# Patient Record
Sex: FEMALE | Race: WHITE | NOT HISPANIC OR LATINO | URBAN - METROPOLITAN AREA
[De-identification: names, ages, dates, MRNs, and addresses within clinical notes are randomized per-mention and may not be internally consistent; named-entity substitution may affect disease eponyms.]

---

## 2020-06-24 ENCOUNTER — TELEPHONE ENCOUNTER (OUTPATIENT)
Dept: URBAN - METROPOLITAN AREA CLINIC 92 | Facility: CLINIC | Age: 45
End: 2020-06-24

## 2020-07-15 ENCOUNTER — OUT OF OFFICE VISIT (OUTPATIENT)
Dept: URBAN - METROPOLITAN AREA MEDICAL CENTER 28 | Facility: MEDICAL CENTER | Age: 45
End: 2020-07-15
Payer: COMMERCIAL

## 2020-07-15 DIAGNOSIS — K50.90 ACUTE CROHN'S DISEASE: ICD-10-CM

## 2020-07-15 DIAGNOSIS — R93.3 ABN FINDINGS-GI TRACT: ICD-10-CM

## 2020-07-15 PROCEDURE — 99254 IP/OBS CNSLTJ NEW/EST MOD 60: CPT | Performed by: INTERNAL MEDICINE

## 2020-07-16 ENCOUNTER — OUT OF OFFICE VISIT (OUTPATIENT)
Dept: URBAN - METROPOLITAN AREA MEDICAL CENTER 28 | Facility: MEDICAL CENTER | Age: 45
End: 2020-07-16
Payer: COMMERCIAL

## 2020-07-16 ENCOUNTER — OUT OF OFFICE VISIT (OUTPATIENT)
Dept: URBAN - METROPOLITAN AREA MEDICAL CENTER 28 | Facility: MEDICAL CENTER | Age: 45
End: 2020-07-16

## 2020-07-16 DIAGNOSIS — K50.80 CROHN'S COLITIS: ICD-10-CM

## 2020-07-16 DIAGNOSIS — R10.84 ABDOMINAL CRAMPING, GENERALIZED: ICD-10-CM

## 2020-07-16 DIAGNOSIS — Z87.19 H/O DIVERTICULITIS OF COLON: ICD-10-CM

## 2020-07-16 PROCEDURE — 992 APS NON BILLABLE: Performed by: INTERNAL MEDICINE

## 2020-07-16 PROCEDURE — G9937 DIG OR SURV COLSCO: HCPCS | Performed by: INTERNAL MEDICINE

## 2020-07-16 PROCEDURE — 45380 COLONOSCOPY AND BIOPSY: CPT | Performed by: INTERNAL MEDICINE

## 2020-07-16 PROCEDURE — 99232 SBSQ HOSP IP/OBS MODERATE 35: CPT | Performed by: INTERNAL MEDICINE

## 2020-07-29 ENCOUNTER — WEB ENCOUNTER (OUTPATIENT)
Dept: URBAN - METROPOLITAN AREA CLINIC 96 | Facility: CLINIC | Age: 45
End: 2020-07-29

## 2020-07-29 ENCOUNTER — OFFICE VISIT (OUTPATIENT)
Dept: URBAN - METROPOLITAN AREA CLINIC 96 | Facility: CLINIC | Age: 45
End: 2020-07-29
Payer: COMMERCIAL

## 2020-07-29 DIAGNOSIS — R10.31 RLQ ABDOMINAL PAIN: ICD-10-CM

## 2020-07-29 DIAGNOSIS — K50.912 CROHN'S DISEASE WITH INTESTINAL OBSTRUCTION, UNSPECIFIED GASTROINTESTINAL TRACT LOCATION: ICD-10-CM

## 2020-07-29 DIAGNOSIS — Z85.3 PERSONAL HISTORY OF BREAST CANCER: ICD-10-CM

## 2020-07-29 PROCEDURE — G8427 DOCREV CUR MEDS BY ELIG CLIN: HCPCS | Performed by: INTERNAL MEDICINE

## 2020-07-29 PROCEDURE — 99214 OFFICE O/P EST MOD 30 MIN: CPT | Performed by: INTERNAL MEDICINE

## 2020-07-29 PROCEDURE — G8417 CALC BMI ABV UP PARAM F/U: HCPCS | Performed by: INTERNAL MEDICINE

## 2020-07-29 PROCEDURE — 1036F TOBACCO NON-USER: CPT | Performed by: INTERNAL MEDICINE

## 2020-07-29 RX ORDER — TAMOXIFEN CITRATE 20 MG/1
TABLET, FILM COATED ORAL
Qty: 0 | Refills: 0 | Status: ACTIVE | COMMUNITY
Start: 1900-01-01

## 2020-07-29 RX ORDER — ADALIMUMAB 40MG/0.4ML
AS DIRECTED KIT SUBCUTANEOUS EVERY 2 WEEKS
Qty: 6 SYRINGE | Refills: 4 | OUTPATIENT
Start: 2020-07-29 | End: 2021-10-22

## 2020-07-29 RX ORDER — ERGOCALCIFEROL 1.25 MG/1
TAKE 1 CAPSULE (50,000 UNIT) BY ORAL ROUTE ONCE WEEKLY CAPSULE ORAL
Qty: 10 | Refills: 0 | Status: ACTIVE | COMMUNITY
Start: 2019-06-25

## 2020-07-29 RX ORDER — LISINOPRIL 10 MG/1
TABLET ORAL
Qty: 0 | Refills: 0 | Status: ACTIVE | COMMUNITY
Start: 1900-01-01

## 2020-07-29 NOTE — HPI-TODAY'S VISIT:
45-year-old female with history of Crohn's disease and breast cancer who was recently at [facility] emergency department complaining of abdominal pain on 7/15/2020.  Patient has been maintained on Humira for her Crohn's disease and had previously was doing well.  However, patient had been having intermittent obstructive symptoms with prior colonoscopy in May 2019 demonstrating ileocecal valve that was minimally narrowed but able to be traversed. CT imaging 7/7/2020 with ileitis--long segment wall thickneing of the distal ileum with mild upstream dilatation of the small bowel, patient did not want admission and was discharged. Again presented to ER 7/15/2020 with abdominal pain, n/v. Distal and termial ileitis again noted witj mild distention of .  Patient was asmall bowel loops proximal to the segment of inflamed distal ileum. Patient dmitted and received IV steroids.  Inpatient colonoscopy performed with Dr. Arce 7/17/2020 with stricture at the ICV unable to traverse, not amenable to balloon dilation. Bx of the ICV with no significant pathologic changes. Patient was discharged and instructed to follow-up. Patient reports

## 2020-07-29 NOTE — PHYSICAL EXAM GASTROINTESTINAL
Abdomen , soft,  tender in RLQ with no guarding or rigidity , no masses palpable , normal bowel sounds , Liver and Spleen , no hepatomegaly present , no hepatosplenomegaly , liver nontender , spleen not palpable

## 2020-08-04 ENCOUNTER — TELEPHONE ENCOUNTER (OUTPATIENT)
Dept: URBAN - METROPOLITAN AREA CLINIC 96 | Facility: CLINIC | Age: 45
End: 2020-08-04

## 2020-09-28 ENCOUNTER — OFFICE VISIT (OUTPATIENT)
Dept: URBAN - METROPOLITAN AREA CLINIC 96 | Facility: CLINIC | Age: 45
End: 2020-09-28
Payer: COMMERCIAL

## 2020-09-28 DIAGNOSIS — R10.31 RLQ ABDOMINAL PAIN: ICD-10-CM

## 2020-09-28 DIAGNOSIS — K50.912 CROHN'S DISEASE WITH INTESTINAL OBSTRUCTION, UNSPECIFIED GASTROINTESTINAL TRACT LOCATION: ICD-10-CM

## 2020-09-28 DIAGNOSIS — Z85.3 PERSONAL HISTORY OF BREAST CANCER: ICD-10-CM

## 2020-09-28 PROCEDURE — 99214 OFFICE O/P EST MOD 30 MIN: CPT | Performed by: INTERNAL MEDICINE

## 2020-09-28 RX ORDER — LISINOPRIL 10 MG/1
TABLET ORAL
Qty: 0 | Refills: 0 | Status: ACTIVE | COMMUNITY
Start: 1900-01-01

## 2020-09-28 RX ORDER — TAMOXIFEN CITRATE 20 MG/1
TABLET, FILM COATED ORAL
Qty: 0 | Refills: 0 | Status: DISCONTINUED | COMMUNITY
Start: 1900-01-01

## 2020-09-28 RX ORDER — ERGOCALCIFEROL 1.25 MG/1
TAKE 1 CAPSULE (50,000 UNIT) BY ORAL ROUTE ONCE WEEKLY CAPSULE ORAL
Qty: 10 | Refills: 0 | Status: ACTIVE | COMMUNITY
Start: 2019-06-25

## 2020-09-28 RX ORDER — ADALIMUMAB 40MG/0.4ML
AS DIRECTED KIT SUBCUTANEOUS EVERY 2 WEEKS
Qty: 6 SYRINGE | Refills: 4 | Status: ACTIVE | COMMUNITY
Start: 2020-07-29 | End: 2021-10-22

## 2020-09-28 RX ORDER — ADALIMUMAB 40MG/0.4ML
AS DIRECTED KIT SUBCUTANEOUS EVERY 2 WEEKS
Qty: 6 SYRINGE | Refills: 4 | OUTPATIENT

## 2020-09-28 NOTE — HPI-TODAY'S VISIT:
45-year-old female with history of Crohn's disease and breast cancer who was recently seen 7/29/2020. As noted prior, had beenm seen at [facility] emergency department complaining of abdominal pain on 7/15/2020.  Patient has been maintained on Humira for her Crohn's disease and had previously was doing well.  However, patient had been having intermittent obstructive symptoms with prior colonoscopy in May 2019 demonstrating ileocecal valve that was minimally narrowed but able to be traversed. CT imaging 7/7/2020 with ileitis--long segment wall thickneing of the distal ileum with mild upstream dilatation of the small bowel, patient did not want admission and was discharged. Again presented to ER 7/15/2020 with abdominal pain, n/v. Distal and termial ileitis again noted witj mild distention of .  Patient was asmall bowel loops proximal to the segment of inflamed distal ileum. Patient dmitted and received IV steroids.  Inpatient colonoscopy performed with Dr. Arce 7/17/2020 with stricture at the ICV unable to traverse, not amenable to balloon dilation. Bx of the ICV with no significant pathologic changes. Patient was discharged and instructed to follow-up. Patient was advised to follow up with CRS Dr. Perez given stricture not amenable to endoscopic dilation. She reports

## 2020-11-03 ENCOUNTER — WEB ENCOUNTER (OUTPATIENT)
Dept: URBAN - METROPOLITAN AREA CLINIC 96 | Facility: CLINIC | Age: 45
End: 2020-11-03

## 2020-11-03 ENCOUNTER — OFFICE VISIT (OUTPATIENT)
Dept: URBAN - METROPOLITAN AREA CLINIC 96 | Facility: CLINIC | Age: 45
End: 2020-11-03
Payer: COMMERCIAL

## 2020-11-03 DIAGNOSIS — R19.7 DIARRHEA: ICD-10-CM

## 2020-11-03 DIAGNOSIS — K50.80 CROHN'S COLITIS: ICD-10-CM

## 2020-11-03 DIAGNOSIS — K50.90 CROHN DISEASE: ICD-10-CM

## 2020-11-03 DIAGNOSIS — Z09 FOLLOW UP: ICD-10-CM

## 2020-11-03 PROCEDURE — 82607 VITAMIN B-12: CPT | Performed by: INTERNAL MEDICINE

## 2020-11-03 PROCEDURE — 82306 VITAMIN D 25 HYDROXY: CPT | Performed by: INTERNAL MEDICINE

## 2020-11-03 PROCEDURE — G9903 PT SCRN TBCO ID AS NON USER: HCPCS | Performed by: INTERNAL MEDICINE

## 2020-11-03 PROCEDURE — 1036F TOBACCO NON-USER: CPT | Performed by: INTERNAL MEDICINE

## 2020-11-03 PROCEDURE — G8417 CALC BMI ABV UP PARAM F/U: HCPCS | Performed by: INTERNAL MEDICINE

## 2020-11-03 PROCEDURE — G8427 DOCREV CUR MEDS BY ELIG CLIN: HCPCS | Performed by: INTERNAL MEDICINE

## 2020-11-03 PROCEDURE — 99215 OFFICE O/P EST HI 40 MIN: CPT | Performed by: INTERNAL MEDICINE

## 2020-11-03 PROCEDURE — 3017F COLORECTAL CA SCREEN DOC REV: CPT | Performed by: INTERNAL MEDICINE

## 2020-11-03 PROCEDURE — G9622 NO UNHEAL ETOH USER: HCPCS | Performed by: INTERNAL MEDICINE

## 2020-11-03 PROCEDURE — 99214 OFFICE O/P EST MOD 30 MIN: CPT | Performed by: INTERNAL MEDICINE

## 2020-11-03 PROCEDURE — G8482 FLU IMMUNIZE ORDER/ADMIN: HCPCS | Performed by: INTERNAL MEDICINE

## 2020-11-03 RX ORDER — ERGOCALCIFEROL 1.25 MG/1
TAKE 1 CAPSULE (50,000 UNIT) BY ORAL ROUTE ONCE WEEKLY CAPSULE ORAL
Qty: 10 | Refills: 0 | Status: ACTIVE | COMMUNITY
Start: 2019-06-25

## 2020-11-03 RX ORDER — COLESEVELAM HYDROCHLORIDE 625 MG/1
3 TABLETS WITH MEALS TABLET, FILM COATED ORAL TWICE A DAY
Qty: 180 TABLET | Refills: 6 | OUTPATIENT
Start: 2020-11-03

## 2020-11-03 RX ORDER — ADALIMUMAB 40MG/0.4ML
AS DIRECTED KIT SUBCUTANEOUS EVERY 2 WEEKS
Qty: 6 SYRINGE | Refills: 4 | Status: ACTIVE | COMMUNITY

## 2020-11-03 RX ORDER — LISINOPRIL 10 MG/1
TABLET ORAL
Qty: 0 | Refills: 0 | Status: ACTIVE | COMMUNITY
Start: 1900-01-01

## 2020-11-03 RX ORDER — USTEKINUMAB 90 MG/ML
AS DIRECTED INJECTION, SOLUTION SUBCUTANEOUS
Qty: 1 | Refills: 11 | OUTPATIENT
Start: 2020-11-03 | End: 2022-09-06

## 2020-11-03 NOTE — HPI-TODAY'S VISIT:
Mu Palma is a 45-year-old female with history of ileal Crohn's disease and breast cancer (in remission), here for evaluation of refractory dz. . Pt is referred by Dr Leon. . Pt was diagnosed with CD in 2011.  She has been on Humira until after her surgery in 9/2020.  CT imaging 7/7/2020 with ileitis--long segment wall thickneing of the distal ileum with mild upstream dilatation of the small bowel, patient did not want admission and was discharged. Again presented to ER 7/15/2020 with abdominal pain, n/v. Distal and termial ileitis again noted witj mild distention of .  Patient was asmall bowel loops proximal to the segment of inflamed distal ileum. Patient dmitted and received IV steroids.  Inpatient colonoscopy performed with Dr. Arce 7/17/2020 with stricture at the ICV unable to traverse, not amenable to balloon dilation. Bx of the ICV with no significant pathologic changes. . Today on 11/3/2020, pt reports that she has up to 3-5 BM per day, no blood in the stool.  No abdominal pain (since her surgery).  She lost 10lbs of weight, unintentional.  No N/V.  . PMH: Breast cancer, stage 1 (2015) in remission SH: No smoke; no ETOH.  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

## 2020-11-04 LAB
A/G RATIO: 1.4
ALBUMIN: 4.2
ALKALINE PHOSPHATASE: 74
ALT (SGPT): 35
AST (SGOT): 25
BASO (ABSOLUTE): 0
BASOS: 1
BILIRUBIN, TOTAL: 0.2
BUN/CREATININE RATIO: 10
BUN: 9
C-REACTIVE PROTEIN, QUANT: 4
CALCIUM: 9.3
CARBON DIOXIDE, TOTAL: 22
CHLORIDE: 102
CREATININE: 0.91
EGFR IF AFRICN AM: 87
EGFR IF NONAFRICN AM: 75
EOS (ABSOLUTE): 0.3
EOS: 4
FERRITIN, SERUM: 61
GLOBULIN, TOTAL: 2.9
GLUCOSE: 117
HBSAG SCREEN: NEGATIVE
HEMATOCRIT: 39.3
HEMATOLOGY COMMENTS:: (no result)
HEMOGLOBIN: 13.1
HEP B CORE AB, TOT: NEGATIVE
HEPATITIS B SURF AB QUANT: 31.7
IMMATURE CELLS: (no result)
IMMATURE GRANS (ABS): 0
IMMATURE GRANULOCYTES: 0
IRON BIND.CAP.(TIBC): 405
IRON SATURATION: 19
IRON: 75
LYMPHS (ABSOLUTE): 2.1
LYMPHS: 24
MCH: 29.6
MCHC: 33.3
MCV: 89
MONOCYTES(ABSOLUTE): 0.5
MONOCYTES: 6
NEUTROPHILS (ABSOLUTE): 5.7
NEUTROPHILS: 65
NRBC: (no result)
PLATELETS: 390
POTASSIUM: 4.4
PROTEIN, TOTAL: 7.1
RBC: 4.42
RDW: 13.3
SEDIMENTATION RATE-WESTERGREN: 19
SODIUM: 138
UIBC: 330
VITAMIN B12: 280
VITAMIN D, 25-HYDROXY: 23.4
WBC: 8.6

## 2020-11-10 ENCOUNTER — LAB OUTSIDE AN ENCOUNTER (OUTPATIENT)
Dept: URBAN - METROPOLITAN AREA CLINIC 96 | Facility: CLINIC | Age: 45
End: 2020-11-10

## 2020-11-24 ENCOUNTER — TELEPHONE ENCOUNTER (OUTPATIENT)
Dept: URBAN - METROPOLITAN AREA CLINIC 92 | Facility: CLINIC | Age: 45
End: 2020-11-24

## 2020-11-30 ENCOUNTER — TELEPHONE ENCOUNTER (OUTPATIENT)
Dept: URBAN - METROPOLITAN AREA CLINIC 92 | Facility: CLINIC | Age: 45
End: 2020-11-30

## 2020-12-08 ENCOUNTER — TELEPHONE ENCOUNTER (OUTPATIENT)
Dept: URBAN - METROPOLITAN AREA CLINIC 92 | Facility: CLINIC | Age: 45
End: 2020-12-08

## 2020-12-09 ENCOUNTER — OFFICE VISIT (OUTPATIENT)
Dept: URBAN - METROPOLITAN AREA CLINIC 97 | Facility: CLINIC | Age: 45
End: 2020-12-09
Payer: COMMERCIAL

## 2020-12-09 DIAGNOSIS — K50.80 CROHN'S COLITIS: ICD-10-CM

## 2020-12-09 PROCEDURE — 96413 CHEMO IV INFUSION 1 HR: CPT | Performed by: INTERNAL MEDICINE

## 2020-12-09 RX ORDER — ERGOCALCIFEROL 1.25 MG/1
TAKE 1 CAPSULE (50,000 UNIT) BY ORAL ROUTE ONCE WEEKLY CAPSULE ORAL
Qty: 10 | Refills: 0 | Status: ACTIVE | COMMUNITY
Start: 2019-06-25

## 2020-12-09 RX ORDER — USTEKINUMAB 90 MG/ML
AS DIRECTED INJECTION, SOLUTION SUBCUTANEOUS
Qty: 1 | Refills: 11 | Status: ACTIVE | COMMUNITY
Start: 2020-11-03 | End: 2022-09-06

## 2020-12-09 RX ORDER — COLESEVELAM HYDROCHLORIDE 625 MG/1
3 TABLETS WITH MEALS TABLET, FILM COATED ORAL TWICE A DAY
Qty: 180 TABLET | Refills: 6 | Status: ACTIVE | COMMUNITY
Start: 2020-11-03

## 2020-12-09 RX ORDER — ADALIMUMAB 40MG/0.4ML
AS DIRECTED KIT SUBCUTANEOUS EVERY 2 WEEKS
Qty: 6 SYRINGE | Refills: 4 | Status: ACTIVE | COMMUNITY

## 2020-12-09 RX ORDER — LISINOPRIL 10 MG/1
TABLET ORAL
Qty: 0 | Refills: 0 | Status: ACTIVE | COMMUNITY
Start: 1900-01-01

## 2021-03-18 ENCOUNTER — TELEPHONE ENCOUNTER (OUTPATIENT)
Dept: URBAN - METROPOLITAN AREA CLINIC 92 | Facility: CLINIC | Age: 46
End: 2021-03-18

## 2021-05-10 ENCOUNTER — ERX REFILL RESPONSE (OUTPATIENT)
Dept: URBAN - METROPOLITAN AREA CLINIC 96 | Facility: CLINIC | Age: 46
End: 2021-05-10

## 2021-05-10 RX ORDER — ONDANSETRON HYDROCHLORIDE 4 MG/1
TAKE ONE TABLET BY MOUTH EVERY 6 HOURS AS NEEDED TABLET, FILM COATED ORAL
Qty: 45 | Refills: 0

## 2021-06-02 ENCOUNTER — OFFICE VISIT (OUTPATIENT)
Dept: URBAN - METROPOLITAN AREA CLINIC 98 | Facility: CLINIC | Age: 46
End: 2021-06-02

## 2021-06-02 RX ORDER — COLESEVELAM HYDROCHLORIDE 625 MG/1
3 TABLETS WITH MEALS TABLET, FILM COATED ORAL TWICE A DAY
Qty: 180 TABLET | Refills: 6 | COMMUNITY
Start: 2020-11-03

## 2021-06-02 RX ORDER — LISINOPRIL 10 MG/1
TABLET ORAL
Qty: 0 | Refills: 0 | COMMUNITY
Start: 1900-01-01

## 2021-06-02 RX ORDER — ADALIMUMAB 40MG/0.4ML
AS DIRECTED KIT SUBCUTANEOUS EVERY 2 WEEKS
Qty: 6 SYRINGE | Refills: 4 | COMMUNITY

## 2021-06-02 RX ORDER — USTEKINUMAB 90 MG/ML
AS DIRECTED INJECTION, SOLUTION SUBCUTANEOUS
Qty: 1 | Refills: 11 | COMMUNITY
Start: 2020-11-03 | End: 2022-09-06

## 2021-06-02 RX ORDER — ERGOCALCIFEROL 1.25 MG/1
TAKE 1 CAPSULE (50,000 UNIT) BY ORAL ROUTE ONCE WEEKLY CAPSULE ORAL
Qty: 10 | Refills: 0 | COMMUNITY
Start: 2019-06-25

## 2021-06-02 RX ORDER — ONDANSETRON HYDROCHLORIDE 4 MG/1
TAKE ONE TABLET BY MOUTH EVERY 6 HOURS AS NEEDED TABLET, FILM COATED ORAL
Qty: 45 | Refills: 0 | COMMUNITY

## 2021-10-18 ENCOUNTER — LAB OUTSIDE AN ENCOUNTER (OUTPATIENT)
Dept: URBAN - METROPOLITAN AREA CLINIC 96 | Facility: CLINIC | Age: 46
End: 2021-10-18

## 2021-10-18 ENCOUNTER — OFFICE VISIT (OUTPATIENT)
Dept: URBAN - METROPOLITAN AREA CLINIC 96 | Facility: CLINIC | Age: 46
End: 2021-10-18
Payer: COMMERCIAL

## 2021-10-18 DIAGNOSIS — R19.7 DIARRHEA: ICD-10-CM

## 2021-10-18 DIAGNOSIS — Z09 FOLLOW UP: ICD-10-CM

## 2021-10-18 DIAGNOSIS — K50.90 CROHN DISEASE: ICD-10-CM

## 2021-10-18 LAB
ABSOLUTE BASOPHIL COUNT: 0.05
ABSOLUTE EOSINOPHIL COUNT: 0.23
ABSOLUTE IMMATURE GRANULOCYTE  COUNT: 0.04
ABSOLUTE LYMPHOCYTE COUNT: 2.09
ABSOLUTE MONOCYTE COUNT: 0.7
ABSOLUTE NEUTROPHIL COUNT (ANC): 8.74
ABSOLUTE NRBC  COUNT: 0
AG RATIO: 1
ALBUMIN LEVEL: 4.3
ALK PHOS: 68
ALT: 41
ANION GAP: 12
AST: 29
BASOPHIL AUTO: 0
BILIRUBIN TOTAL: 0.3
BUN/CREAT RATIO: 12
BUN: 9
CALCIUM LEVEL: 9.9
CHLORIDE LEVEL: 103
CO2 LEVEL: 22
CREATININE LEVEL: 0.8
CRP: 0.41
EOS AUTO: 2
ESR WESTERGREN: 15
FERRITIN LEVEL: 142.9
FOLATE LEVEL: 19.9
GFR AFRICAN AMERICAN: >60
GFR NON AFRICAN AMERICAN: >60
GLUCOSE LEVEL: 87
HCT: 42.9
HGB: 14.5
IMMATURE GRANULOCYTES AUTO: 0.3
IRON LEVEL: 93
IRON SAT: 26
LIPASE LEVEL: 30
LYMPH AUTO: 18
MCH: 30.9
MCHC: 33.8
MCV: 91.3
MONO AUTO: 6
MPV: 10.3
NEUTRO AUTO: 74
NRBC AUTO: 0
OSMO (CALC): 272
PERFORMING LAB: (no result)
PLATELETS: 417
POTASSIUM LEVEL: 4.2
PROTEIN TOTAL: 8.1
RBC: 4.7
RDW: 12.3
SODIUM LEVEL: 137
TIBC: 352
VITAMIN B12 LEVEL: 387
VITAMIN D 25 OH: 33
WBC: 11.8

## 2021-10-18 PROCEDURE — 99215 OFFICE O/P EST HI 40 MIN: CPT | Performed by: PHYSICIAN ASSISTANT

## 2021-10-18 RX ORDER — SERTRALINE HYDROCHLORIDE 25 MG/1
1 TABLET TABLET, FILM COATED ORAL ONCE A DAY
Status: ACTIVE | COMMUNITY

## 2021-10-18 RX ORDER — ONDANSETRON HYDROCHLORIDE 4 MG/1
TAKE ONE TABLET BY MOUTH EVERY 6 HOURS AS NEEDED TABLET, FILM COATED ORAL
Qty: 45 | Refills: 0 | Status: DISCONTINUED | COMMUNITY

## 2021-10-18 RX ORDER — USTEKINUMAB 90 MG/ML
AS DIRECTED INJECTION, SOLUTION SUBCUTANEOUS
Qty: 1 | Refills: 11 | Status: ACTIVE | COMMUNITY
Start: 2020-11-03 | End: 2022-09-06

## 2021-10-18 RX ORDER — POLYETHYLENE GLYCOL 3350, SODIUM SULFATE, SODIUM CHLORIDE, POTASSIUM CHLORIDE, ASCORBIC ACID, SODIUM ASCORBATE 140-9-5.2G
AS DIRECTED KIT ORAL 1
Qty: 1 | Refills: 0 | OUTPATIENT
Start: 2021-10-18 | End: 2021-10-19

## 2021-10-18 RX ORDER — ADALIMUMAB 40MG/0.4ML
AS DIRECTED KIT SUBCUTANEOUS EVERY 2 WEEKS
Qty: 6 SYRINGE | Refills: 4 | Status: DISCONTINUED | COMMUNITY

## 2021-10-18 RX ORDER — COLESEVELAM HYDROCHLORIDE 625 MG/1
3 TABLETS WITH MEALS TABLET, FILM COATED ORAL TWICE A DAY
Qty: 180 TABLET | Refills: 6 | Status: DISCONTINUED | COMMUNITY
Start: 2020-11-03

## 2021-10-18 RX ORDER — LISINOPRIL 10 MG/1
TABLET ORAL
Qty: 0 | Refills: 0 | Status: ACTIVE | COMMUNITY
Start: 1900-01-01

## 2021-10-18 RX ORDER — CHOLESTYRAMINE 4 G/9G
1 PACKET MIXED WITH WATER OR NON-CARBONATED DRINK POWDER, FOR SUSPENSION ORAL ONCE A DAY
Qty: 30 | Refills: 6 | OUTPATIENT
Start: 2021-10-18

## 2021-10-18 RX ORDER — ERGOCALCIFEROL 1.25 MG/1
TAKE 1 CAPSULE (50,000 UNIT) BY ORAL ROUTE ONCE WEEKLY CAPSULE ORAL
Qty: 10 | Refills: 0 | Status: ACTIVE | COMMUNITY
Start: 2019-06-25

## 2021-10-18 RX ORDER — POLYETHYLENE GLYCOL 3350, SODIUM SULFATE, SODIUM CHLORIDE, POTASSIUM CHLORIDE, ASCORBIC ACID, SODIUM ASCORBATE 7.5-2.691G
AS DIRECTED KIT ORAL AS DIRECTED
Qty: 1 | Refills: 0 | OUTPATIENT
Start: 2021-10-18 | End: 2021-10-19

## 2021-10-18 NOTE — HPI-TODAY'S VISIT:
Patient has Crohn's and hx breast cancer She does Stelara every 8 weeks - does not feel like it is working as well No pain since her surgery a year ago - very rare cramping Her diarrhea is out of control - she is having at least 5 BM a day and it can wake her up in the middle of the night - she eats and has to have a BM Dr. Arce had prescribed welchol but her insurance would not pay for it No BRBPR or melena Rare nausea, no emesis Zofran helps for the nausea

## 2021-10-22 LAB
CARBOHYDRATE AG 19-9: (no result)
PERFORMING LAB: (no result)

## 2021-10-26 ENCOUNTER — LAB OUTSIDE AN ENCOUNTER (OUTPATIENT)
Dept: URBAN - METROPOLITAN AREA CLINIC 80 | Facility: CLINIC | Age: 46
End: 2021-10-26

## 2021-10-28 LAB — CALPROTECTIN, STOOL - QDX: (no result)

## 2021-11-11 ENCOUNTER — TELEPHONE ENCOUNTER (OUTPATIENT)
Dept: URBAN - METROPOLITAN AREA CLINIC 92 | Facility: CLINIC | Age: 46
End: 2021-11-11

## 2021-11-11 RX ORDER — USTEKINUMAB 90 MG/ML
AS DIRECTED INJECTION, SOLUTION SUBCUTANEOUS
Qty: 1 | Refills: 11
Start: 2020-11-03 | End: 2023-09-14

## 2021-11-12 ENCOUNTER — TELEPHONE ENCOUNTER (OUTPATIENT)
Dept: URBAN - METROPOLITAN AREA SURGERY CENTER 30 | Facility: SURGERY CENTER | Age: 46
End: 2021-11-12

## 2021-11-19 ENCOUNTER — OFFICE VISIT (OUTPATIENT)
Dept: URBAN - METROPOLITAN AREA MEDICAL CENTER 28 | Facility: MEDICAL CENTER | Age: 46
End: 2021-11-19
Payer: COMMERCIAL

## 2021-11-19 DIAGNOSIS — K50.80 CROHN'S COLITIS: ICD-10-CM

## 2021-11-19 PROCEDURE — 45380 COLONOSCOPY AND BIOPSY: CPT | Performed by: INTERNAL MEDICINE

## 2021-11-19 RX ORDER — USTEKINUMAB 90 MG/ML
AS DIRECTED INJECTION, SOLUTION SUBCUTANEOUS
Qty: 1 | Refills: 11 | Status: ACTIVE | COMMUNITY
Start: 2020-11-03 | End: 2023-09-14

## 2021-11-19 RX ORDER — SERTRALINE HYDROCHLORIDE 25 MG/1
1 TABLET TABLET, FILM COATED ORAL ONCE A DAY
Status: ACTIVE | COMMUNITY

## 2021-11-19 RX ORDER — ERGOCALCIFEROL 1.25 MG/1
TAKE 1 CAPSULE (50,000 UNIT) BY ORAL ROUTE ONCE WEEKLY CAPSULE ORAL
Qty: 10 | Refills: 0 | Status: ACTIVE | COMMUNITY
Start: 2019-06-25

## 2021-11-19 RX ORDER — CHOLESTYRAMINE 4 G/9G
1 PACKET MIXED WITH WATER OR NON-CARBONATED DRINK POWDER, FOR SUSPENSION ORAL ONCE A DAY
Qty: 30 | Refills: 6 | Status: ACTIVE | COMMUNITY
Start: 2021-10-18

## 2021-11-19 RX ORDER — LISINOPRIL 10 MG/1
TABLET ORAL
Qty: 0 | Refills: 0 | Status: ACTIVE | COMMUNITY
Start: 1900-01-01

## 2021-11-19 NOTE — HPI-TODAY'S VISIT:
11/19/2021: Colon: - Prior evidence of resection. Findings: - The terminal ileum appeared normal. This was biopsied with a cold forceps for histology. The pathology specimen was placed into Bottle Number 1. - Localized inflammation, mild in severity and characterized by erosions, erythema and shallow ulcerations was found in the surgical anastomosis. Biopsies were taken with a cold forceps for histology. The pathology specimen was placed into Bottle Number 2. - The colon (entire examined portion) appeared normal. Biopsies were taken with a cold forceps for histology. The pathology specimen was placed into Bottle Number 3. - The entire examined colon appeared normal on direct and retroflexion views.

## 2022-02-14 ENCOUNTER — OFFICE VISIT (OUTPATIENT)
Dept: URBAN - METROPOLITAN AREA TELEHEALTH 2 | Facility: TELEHEALTH | Age: 47
End: 2022-02-14
Payer: COMMERCIAL

## 2022-02-14 DIAGNOSIS — K50.80 CROHN'S COLITIS: ICD-10-CM

## 2022-02-14 DIAGNOSIS — Z79.899 LONG-TERM USE OF IMMUNOSUPPRESSANT MEDICATION: ICD-10-CM

## 2022-02-14 DIAGNOSIS — R19.7 DIARRHEA: ICD-10-CM

## 2022-02-14 PROCEDURE — 99214 OFFICE O/P EST MOD 30 MIN: CPT | Performed by: INTERNAL MEDICINE

## 2022-02-14 RX ORDER — USTEKINUMAB 90 MG/ML
AS DIRECTED INJECTION, SOLUTION SUBCUTANEOUS
Qty: 1 | Refills: 11 | COMMUNITY
Start: 2020-11-03 | End: 2023-09-14

## 2022-02-14 RX ORDER — SERTRALINE HYDROCHLORIDE 25 MG/1
1 TABLET TABLET, FILM COATED ORAL ONCE A DAY
COMMUNITY

## 2022-02-14 RX ORDER — ERGOCALCIFEROL 1.25 MG/1
TAKE 1 CAPSULE (50,000 UNIT) BY ORAL ROUTE ONCE WEEKLY CAPSULE ORAL
Qty: 10 | Refills: 0 | COMMUNITY
Start: 2019-06-25

## 2022-02-14 RX ORDER — LISINOPRIL 10 MG/1
TABLET ORAL
Qty: 0 | Refills: 0 | COMMUNITY
Start: 1900-01-01

## 2022-02-14 NOTE — HPI-TODAY'S VISIT:
Pt Louie is a 46-year-old female with history of ileal Crohn's disease and breast cancer (in remission) and Stelara (started 12/2020), here for evaluation of refractory dz. . Pt is referred by Dr Leon. . Pt was diagnosed with CD in 2011.  She has been on Humira until after her surgery in 9/2020.  CT imaging 7/7/2020 with ileitis--long segment wall thickneing of the distal ileum with mild upstream dilatation of the small bowel, patient did not want admission and was discharged. Again presented to ER 7/15/2020 with abdominal pain, n/v. Distal and termial ileitis again noted witj mild distention of .  Patient was asmall bowel loops proximal to the segment of inflamed distal ileum. Patient admitted and received IV steroids.  Inpatient colonoscopy performed with Dr. Arce 7/17/2020 with stricture at the ICV unable to traverse, not amenable to balloon dilation. Bx of the ICV with no significant pathologic changes. . Previously on 11/3/2020, pt reports that she has up to 3-5 BM per day, no blood in the stool.  No abdominal pain (since her surgery).  She lost 10lbs of weight, unintentional.  No N/V. . Today on 2/14/2022, pt reports that she continues to have diarrhea, takes 1-2 imodium per day.  No abd pain, no blood in the stool. . 11/19/2021: Colon: - Prior evidence of resection. Findings: - The terminal ileum appeared normal. This was biopsied with a cold forceps for histology. The pathology specimen was placed into Bottle Number 1. - Localized inflammation, mild in severity and characterized by erosions, erythema and shallow ulcerations was found in the surgical anastomosis. Biopsies were taken with a cold forceps for histology. The pathology specimen was placed into Bottle Number 2. - The colon (entire examined portion) appeared normal. Biopsies were taken with a cold forceps for histology. The pathology specimen was placed into Bottle Number 3. - The entire examined colon appeared normal on direct and retroflexion views. . Pathology: mild acute ileitis at both sites; normal colon . PMH: Breast cancer, stage 1 (2015) in remission SH: No smoke; no ETOH.  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

## 2022-06-15 ENCOUNTER — WEB ENCOUNTER (OUTPATIENT)
Dept: URBAN - METROPOLITAN AREA CLINIC 96 | Facility: CLINIC | Age: 47
End: 2022-06-15

## 2022-06-15 RX ORDER — ERGOCALCIFEROL 1.25 MG/1
TAKE 1 CAPSULE (50,000 UNIT) BY ORAL ROUTE ONCE WEEKLY CAPSULE ORAL
Qty: 10 | Refills: 0 | COMMUNITY
Start: 2019-06-25

## 2022-06-15 RX ORDER — LISINOPRIL 10 MG/1
TABLET ORAL
Qty: 0 | Refills: 0 | COMMUNITY
Start: 1900-01-01

## 2022-06-15 RX ORDER — USTEKINUMAB 90 MG/ML
AS DIRECTED INJECTION, SOLUTION SUBCUTANEOUS
Qty: 1 | Refills: 11 | COMMUNITY
Start: 2020-11-03 | End: 2023-09-14

## 2022-06-15 RX ORDER — ONDANSETRON HYDROCHLORIDE 4 MG/1
1 TABLET TABLET, FILM COATED ORAL EVERY 8 HOURS PRN
Qty: 60 | Refills: 3 | OUTPATIENT
Start: 2022-06-19

## 2022-06-15 RX ORDER — SERTRALINE HYDROCHLORIDE 25 MG/1
1 TABLET TABLET, FILM COATED ORAL ONCE A DAY
COMMUNITY

## 2022-08-01 ENCOUNTER — TELEPHONE ENCOUNTER (OUTPATIENT)
Dept: URBAN - METROPOLITAN AREA CLINIC 92 | Facility: CLINIC | Age: 47
End: 2022-08-01

## 2022-08-01 ENCOUNTER — LAB OUTSIDE AN ENCOUNTER (OUTPATIENT)
Dept: URBAN - METROPOLITAN AREA TELEHEALTH 2 | Facility: TELEHEALTH | Age: 47
End: 2022-08-01

## 2022-08-01 ENCOUNTER — OFFICE VISIT (OUTPATIENT)
Dept: URBAN - METROPOLITAN AREA TELEHEALTH 2 | Facility: TELEHEALTH | Age: 47
End: 2022-08-01
Payer: COMMERCIAL

## 2022-08-01 VITALS — WEIGHT: 231 LBS | BODY MASS INDEX: 37.12 KG/M2 | HEIGHT: 66 IN

## 2022-08-01 DIAGNOSIS — Z80.0 FAMILY HISTORY OF CANCER OF DIGESTIVE SYSTEM: ICD-10-CM

## 2022-08-01 DIAGNOSIS — K21.9 GERD (GASTROESOPHAGEAL REFLUX DISEASE): ICD-10-CM

## 2022-08-01 DIAGNOSIS — Z09 FOLLOW UP: ICD-10-CM

## 2022-08-01 DIAGNOSIS — R19.7 DIARRHEA: ICD-10-CM

## 2022-08-01 DIAGNOSIS — R10.84 GENERALIZED ABDOMINAL PAIN: ICD-10-CM

## 2022-08-01 DIAGNOSIS — K50.90 CROHN DISEASE: ICD-10-CM

## 2022-08-01 DIAGNOSIS — R63.4 WEIGHT LOSS: ICD-10-CM

## 2022-08-01 DIAGNOSIS — K90.89 BILE SALT-INDUCED DIARRHEA: ICD-10-CM

## 2022-08-01 DIAGNOSIS — Z79.899 LONG-TERM USE OF IMMUNOSUPPRESSANT MEDICATION: ICD-10-CM

## 2022-08-01 PROBLEM — 69980003: Status: ACTIVE | Noted: 2022-08-01

## 2022-08-01 PROBLEM — 235595009 GASTROESOPHAGEAL REFLUX DISEASE: Status: ACTIVE | Noted: 2022-08-01

## 2022-08-01 PROCEDURE — 99215 OFFICE O/P EST HI 40 MIN: CPT | Performed by: INTERNAL MEDICINE

## 2022-08-01 RX ORDER — CYANOCOBALAMIN 1000 UG/ML
1 ML INJECTION INTRAMUSCULAR; SUBCUTANEOUS
Qty: 4 | Refills: 11 | OUTPATIENT
Start: 2022-08-01 | End: 2023-07-27

## 2022-08-01 RX ORDER — COLESEVELAM HYDROCHLORIDE 625 MG/1
3 TABLETS WITH MEALS TABLET, FILM COATED ORAL TWICE A DAY
Qty: 180 TABLET | Refills: 6 | OUTPATIENT
Start: 2022-08-01

## 2022-08-01 RX ORDER — USTEKINUMAB 90 MG/ML
1 INJECTION INJECTION, SOLUTION SUBCUTANEOUS ONCE
Qty: 1 | Refills: 11 | OUTPATIENT
Start: 2022-08-01 | End: 2024-06-03

## 2022-08-01 RX ORDER — SERTRALINE HYDROCHLORIDE 25 MG/1
1 TABLET TABLET, FILM COATED ORAL ONCE A DAY
COMMUNITY

## 2022-08-01 RX ORDER — ONDANSETRON HYDROCHLORIDE 4 MG/1
1 TABLET TABLET, FILM COATED ORAL EVERY 8 HOURS PRN
Qty: 60 | Refills: 3 | COMMUNITY
Start: 2022-06-19

## 2022-08-01 RX ORDER — LISINOPRIL 10 MG/1
TABLET ORAL
Qty: 0 | Refills: 0 | COMMUNITY
Start: 1900-01-01

## 2022-08-01 RX ORDER — ERGOCALCIFEROL 1.25 MG/1
TAKE 1 CAPSULE (50,000 UNIT) BY ORAL ROUTE ONCE WEEKLY CAPSULE ORAL
Qty: 10 | Refills: 0 | COMMUNITY
Start: 2019-06-25

## 2022-08-01 RX ORDER — USTEKINUMAB 90 MG/ML
AS DIRECTED INJECTION, SOLUTION SUBCUTANEOUS
Qty: 1 | Refills: 11 | COMMUNITY
Start: 2020-11-03 | End: 2023-09-14

## 2022-08-01 RX ORDER — PANTOPRAZOLE SODIUM 40 MG/1
1 TABLET TABLET, DELAYED RELEASE ORAL ONCE A DAY
Qty: 30 TABLET | Refills: 11 | OUTPATIENT
Start: 2022-08-01

## 2022-08-01 NOTE — HPI-TODAY'S VISIT:
Pt Louie is a 47-year-old female with history of ileal Crohn's disease and breast cancer (in remission) and Stelara (started 12/2020), here for evaluation of refractory dz. . Pt is referred by Dr Leon. . Pt was diagnosed with CD in 2011.  She has been on Humira until after her surgery in 9/2020.  CT imaging 7/7/2020 with ileitis--long segment wall thickneing of the distal ileum with mild upstream dilatation of the small bowel, patient did not want admission and was discharged. Again presented to ER 7/15/2020 with abdominal pain, n/v. Distal and termial ileitis again noted witj mild distention of .  Patient was asmall bowel loops proximal to the segment of inflamed distal ileum. Patient admitted and received IV steroids.  Inpatient colonoscopy performed with Dr. Arce 7/17/2020 with stricture at the ICV unable to traverse, not amenable to balloon dilation. Bx of the ICV with no significant pathologic changes. . Previously on 11/3/2020, pt reports that she has up to 3-5 BM per day, no blood in the stool.  No abdominal pain (since her surgery).  She lost 10lbs of weight, unintentional.  No N/V. Previously on 2/14/2022, pt reports that she continues to have diarrhea, takes 1-2 imodium per day.  No abd pain, no blood in the stool. . Today on 8/1/2022, pt reports that her  had CABG surgery last weekend, doing well.  She has lost 15 lbs of weight unintentionally.  She believes she is having malabsorption, including diarrhea and sxs of low blood sugar, extreme exhaustion, and anxiety.  She is having lot of diarrhea, bile as well seen. . 11/19/2021: Colon: - Prior evidence of resection. Findings: - The terminal ileum appeared normal. This was biopsied with a cold forceps for histology. The pathology specimen was placed into Bottle Number 1. - Localized inflammation, mild in severity and characterized by erosions, erythema and shallow ulcerations was found in the surgical anastomosis. Biopsies were taken with a cold forceps for histology. The pathology specimen was placed into Bottle Number 2. - The colon (entire examined portion) appeared normal. Biopsies were taken with a cold forceps for histology. The pathology specimen was placed into Bottle Number 3. - The entire examined colon appeared normal on direct and retroflexion views. . Pathology: mild acute ileitis at both sites; normal colon . PMH: Breast cancer, stage 1 (2015) in remission SH: No smoke; no ETOH.  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

## 2022-08-10 ENCOUNTER — LAB OUTSIDE AN ENCOUNTER (OUTPATIENT)
Dept: URBAN - METROPOLITAN AREA CLINIC 96 | Facility: CLINIC | Age: 47
End: 2022-08-10

## 2022-12-21 ENCOUNTER — WEB ENCOUNTER (OUTPATIENT)
Dept: URBAN - METROPOLITAN AREA CLINIC 96 | Facility: CLINIC | Age: 47
End: 2022-12-21

## 2023-08-21 ENCOUNTER — OFFICE VISIT (OUTPATIENT)
Dept: URBAN - METROPOLITAN AREA TELEHEALTH 2 | Facility: TELEHEALTH | Age: 48
End: 2023-08-21
Payer: COMMERCIAL

## 2023-08-21 ENCOUNTER — TELEPHONE ENCOUNTER (OUTPATIENT)
Dept: URBAN - METROPOLITAN AREA CLINIC 96 | Facility: CLINIC | Age: 48
End: 2023-08-21

## 2023-08-21 DIAGNOSIS — K50.90 CROHN DISEASE: ICD-10-CM

## 2023-08-21 DIAGNOSIS — R19.7 DIARRHEA: ICD-10-CM

## 2023-08-21 DIAGNOSIS — Z80.0 FAMILY HISTORY OF CANCER OF DIGESTIVE SYSTEM: ICD-10-CM

## 2023-08-21 DIAGNOSIS — K21.9 ACID REFLUX: ICD-10-CM

## 2023-08-21 PROCEDURE — 99215 OFFICE O/P EST HI 40 MIN: CPT | Performed by: INTERNAL MEDICINE

## 2023-08-21 RX ORDER — METRONIDAZOLE 500 MG/1
1 TABLET TABLET, FILM COATED ORAL
Qty: 28 TABLET | Refills: 0 | OUTPATIENT
Start: 2023-08-21 | End: 2023-09-04

## 2023-08-21 RX ORDER — LISINOPRIL 10 MG/1
TABLET ORAL
Qty: 0 | Refills: 0 | COMMUNITY
Start: 1900-01-01

## 2023-08-21 RX ORDER — SERTRALINE HYDROCHLORIDE 25 MG/1
1 TABLET TABLET, FILM COATED ORAL ONCE A DAY
COMMUNITY

## 2023-08-21 RX ORDER — PANTOPRAZOLE SODIUM 40 MG/1
1 TABLET TABLET, DELAYED RELEASE ORAL ONCE A DAY
Qty: 30 TABLET | Refills: 11 | Status: ACTIVE | COMMUNITY
Start: 2022-08-01

## 2023-08-21 RX ORDER — ONDANSETRON HYDROCHLORIDE 4 MG/1
1 TABLET TABLET, FILM COATED ORAL EVERY 8 HOURS PRN
Qty: 60 | Refills: 3 | COMMUNITY
Start: 2022-06-19

## 2023-08-21 RX ORDER — CYANOCOBALAMIN 1000 UG/ML
1 ML INJECTION INTRAMUSCULAR; SUBCUTANEOUS
Qty: 4 | Refills: 11 | OUTPATIENT
Start: 2023-08-21 | End: 2024-08-15

## 2023-08-21 RX ORDER — ERGOCALCIFEROL 1.25 MG/1
TAKE 1 CAPSULE (50,000 UNIT) BY ORAL ROUTE ONCE WEEKLY CAPSULE ORAL
Qty: 10 | Refills: 0 | COMMUNITY
Start: 2019-06-25

## 2023-08-21 RX ORDER — PANTOPRAZOLE SODIUM 40 MG/1
1 TABLET TABLET, DELAYED RELEASE ORAL ONCE A DAY
Qty: 30 TABLET | Refills: 11 | OUTPATIENT

## 2023-08-21 RX ORDER — USTEKINUMAB 90 MG/ML
1 INJECTION INJECTION, SOLUTION SUBCUTANEOUS ONCE
Qty: 1 | Refills: 11 | Status: ACTIVE | COMMUNITY
Start: 2022-08-01 | End: 2024-06-03

## 2023-08-21 RX ORDER — USTEKINUMAB 90 MG/ML
AS DIRECTED INJECTION, SOLUTION SUBCUTANEOUS
Qty: 1 | Refills: 11 | COMMUNITY
Start: 2020-11-03 | End: 2023-09-14

## 2023-08-21 RX ORDER — USTEKINUMAB 90 MG/ML
1 INJECTION INJECTION, SOLUTION SUBCUTANEOUS ONCE
Qty: 1 | Refills: 11 | OUTPATIENT

## 2023-08-21 RX ORDER — COLESEVELAM HYDROCHLORIDE 625 MG/1
3 TABLETS WITH MEALS TABLET, FILM COATED ORAL TWICE A DAY
Qty: 180 TABLET | Refills: 6 | Status: ACTIVE | COMMUNITY
Start: 2022-08-01

## 2023-08-21 RX ORDER — PREDNISONE 10 MG/1
1 TABLET TABLET ORAL ONCE A DAY
Qty: 30 TABLET | Refills: 0 | OUTPATIENT
Start: 2023-08-21 | End: 2023-09-20

## 2023-08-21 NOTE — HPI-TODAY'S VISIT:
Pt Louie is a 48-year-old female with history of ileal Crohn's disease and breast cancer (in remission) and Stelara (started 12/2020), here for evaluation of refractory dz. . Pt is referred by Dr Leon. . Pt was diagnosed with CD in 2011.  She has been on Humira until after her surgery in 9/2020.  CT imaging 7/7/2020 with ileitis--long segment wall thickneing of the distal ileum with mild upstream dilatation of the small bowel, patient did not want admission and was discharged. Again presented to ER 7/15/2020 with abdominal pain, n/v. Distal and termial ileitis again noted witj mild distention of .  Patient was asmall bowel loops proximal to the segment of inflamed distal ileum. Patient admitted and received IV steroids.  Inpatient colonoscopy performed with Dr. Arce 7/17/2020 with stricture at the ICV unable to traverse, not amenable to balloon dilation. Bx of the ICV with no significant pathologic changes. . Previously on 11/3/2020, pt reports that she has up to 3-5 BM per day, no blood in the stool.  No abdominal pain (since her surgery).  She lost 10lbs of weight, unintentional.  No N/V. Previously on 2/14/2022, pt reports that she continues to have diarrhea, takes 1-2 imodium per day.  No abd pain, no blood in the stool. . Previously on 8/1/2022, pt reports that her  had CABG surgery last weekend, doing well.  She has lost 15 lbs of weight unintentionally.  She believes she is having malabsorption, including diarrhea and sxs of low blood sugar, extreme exhaustion, and anxiety.  She is having lot of diarrhea, bile as well seen. . Today on 8/21/2023, pt reports that she had hysterectomy in July.  Since then, she has been doing poorly.  However, has been unable to eat much, lot of abdominal pain, thought it was a bowel obstruction, but CT at [facility] was neg. Saw some fluid in the pelvis and enlarged liver.  Having lot of diarrhea, yellow in color.    11/19/2021: Colon: - Prior evidence of resection. Findings: - The terminal ileum appeared normal. This was biopsied with a cold forceps for histology. The pathology specimen was placed into Bottle Number 1. - Localized inflammation, mild in severity and characterized by erosions, erythema and shallow ulcerations was found in the surgical anastomosis. Biopsies were taken with a cold forceps for histology. The pathology specimen was placed into Bottle Number 2. - The colon (entire examined portion) appeared normal. Biopsies were taken with a cold forceps for histology. The pathology specimen was placed into Bottle Number 3. - The entire examined colon appeared normal on direct and retroflexion views. . Pathology: mild acute ileitis at both sites; normal colon . PMH: Breast cancer, stage 1 (2015) in remission SH: No smoke; no ETOH.  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

## 2023-08-24 ENCOUNTER — WEB ENCOUNTER (OUTPATIENT)
Dept: URBAN - METROPOLITAN AREA CLINIC 96 | Facility: CLINIC | Age: 48
End: 2023-08-24

## 2023-09-12 LAB
A/G RATIO: 1
ABSOLUTE BASOPHILS: 63
ABSOLUTE EOSINOPHILS: 173
ABSOLUTE LYMPHOCYTES: 2151
ABSOLUTE MONOCYTES: 973
ABSOLUTE NEUTROPHILS: (no result)
ALBUMIN: 3.7
ALKALINE PHOSPHATASE: 72
ALT (SGPT): 35
AST (SGOT): 34
BASOPHILS: 0.4
BILIRUBIN, TOTAL: 0.4
BUN/CREATININE RATIO: 11
BUN: 6
CALCIUM: 9.1
CARBON DIOXIDE, TOTAL: 30
CHLORIDE: 97
CREATININE: 0.57
EGFR: 111
EOSINOPHILS: 1.1
FERRITIN, SERUM: 134
GLOBULIN, TOTAL: 3.7
GLUCOSE: 125
HEMATOCRIT: 38.9
HEMOGLOBIN: 12.8
IRON BIND.CAP.(TIBC): 296
IRON SATURATION: 7
IRON: 21
LYMPHOCYTES: 13.7
MCH: 27.9
MCHC: 32.9
MCV: 84.9
MITOGEN-NIL: 2.23
MONOCYTES: 6.2
MPV: 10.8
NEUTROPHILS: 78.6
PLATELET COUNT: 561
POTASSIUM: 3.4
PROTEIN, TOTAL: 7.4
QUANTIFERON NIL VALUE: 0.01
QUANTIFERON TB1 AG VALUE: 0.02
QUANTIFERON TB2 AG VALUE: 0.02
QUANTIFERON-TB GOLD PLUS: NEGATIVE
RDW: 12.8
RED BLOOD CELL COUNT: 4.58
SODIUM: 141
VITAMIN B12: 1672
VITAMIN D,25-OH,TOTAL,IA: 68
WHITE BLOOD CELL COUNT: 15.7

## 2023-09-15 LAB — CLOSTRIDIUM DIFFICILE TOXINB,QL REAL TIME PCR: NOT DETECTED

## 2023-09-22 ENCOUNTER — LAB OUTSIDE AN ENCOUNTER (OUTPATIENT)
Dept: URBAN - METROPOLITAN AREA TELEHEALTH 2 | Facility: TELEHEALTH | Age: 48
End: 2023-09-22

## 2023-09-22 ENCOUNTER — OFFICE VISIT (OUTPATIENT)
Dept: URBAN - METROPOLITAN AREA TELEHEALTH 2 | Facility: TELEHEALTH | Age: 48
End: 2023-09-22
Payer: COMMERCIAL

## 2023-09-22 ENCOUNTER — TELEPHONE ENCOUNTER (OUTPATIENT)
Dept: URBAN - METROPOLITAN AREA CLINIC 96 | Facility: CLINIC | Age: 48
End: 2023-09-22

## 2023-09-22 VITALS — HEIGHT: 66 IN

## 2023-09-22 DIAGNOSIS — R10.84 GENERALIZED ABDOMINAL PAIN: ICD-10-CM

## 2023-09-22 DIAGNOSIS — K21.9 GERD (GASTROESOPHAGEAL REFLUX DISEASE): ICD-10-CM

## 2023-09-22 DIAGNOSIS — R63.4 WEIGHT LOSS: ICD-10-CM

## 2023-09-22 DIAGNOSIS — Z79.899 LONG-TERM USE OF IMMUNOSUPPRESSANT MEDICATION: ICD-10-CM

## 2023-09-22 DIAGNOSIS — K50.80 CROHN'S COLITIS: ICD-10-CM

## 2023-09-22 DIAGNOSIS — Z80.0 FAMILY HISTORY OF CANCER OF DIGESTIVE SYSTEM: ICD-10-CM

## 2023-09-22 DIAGNOSIS — Z09 FOLLOW UP: ICD-10-CM

## 2023-09-22 DIAGNOSIS — K90.89 BILE SALT-INDUCED DIARRHEA: ICD-10-CM

## 2023-09-22 DIAGNOSIS — R93.5 ABNORMAL ABDOMINAL CT SCAN: ICD-10-CM

## 2023-09-22 PROCEDURE — 99215 OFFICE O/P EST HI 40 MIN: CPT | Performed by: INTERNAL MEDICINE

## 2023-09-22 RX ORDER — BALSALAZIDE DISODIUM 750 MG/1
4 CAPSULES CAPSULE ORAL ONCE DAILY
Qty: 120 | Refills: 11 | OUTPATIENT
Start: 2023-09-22 | End: 2024-09-16

## 2023-09-22 RX ORDER — ONDANSETRON HYDROCHLORIDE 4 MG/1
1 TABLET TABLET, FILM COATED ORAL EVERY 8 HOURS PRN
Qty: 60 | Refills: 3 | COMMUNITY
Start: 2022-06-19

## 2023-09-22 RX ORDER — POLYETHYLENE GLYCOL 3350, SODIUM SULFATE, SODIUM CHLORIDE, POTASSIUM CHLORIDE, ASCORBIC ACID, SODIUM ASCORBATE 7.5-2.691G
AS DIRECTED KIT ORAL ONCE
Qty: 1 KIT | Refills: 0 | OUTPATIENT
Start: 2023-09-26 | End: 2023-09-27

## 2023-09-22 RX ORDER — CYANOCOBALAMIN 1000 UG/ML
1 ML INJECTION INTRAMUSCULAR; SUBCUTANEOUS
Qty: 4 | Refills: 11 | OUTPATIENT

## 2023-09-22 RX ORDER — PANTOPRAZOLE SODIUM 40 MG/1
1 TABLET TABLET, DELAYED RELEASE ORAL ONCE A DAY
Qty: 30 TABLET | Refills: 11 | OUTPATIENT

## 2023-09-22 RX ORDER — LISINOPRIL 10 MG/1
TABLET ORAL
Qty: 0 | Refills: 0 | COMMUNITY
Start: 1900-01-01

## 2023-09-22 RX ORDER — CYANOCOBALAMIN 1000 UG/ML
1 ML INJECTION INTRAMUSCULAR; SUBCUTANEOUS
Qty: 4 | Refills: 11 | Status: ACTIVE | COMMUNITY
Start: 2023-08-21 | End: 2024-08-15

## 2023-09-22 RX ORDER — ERGOCALCIFEROL 1.25 MG/1
TAKE 1 CAPSULE (50,000 UNIT) BY ORAL ROUTE ONCE WEEKLY CAPSULE ORAL
Qty: 10 | Refills: 0 | COMMUNITY
Start: 2019-06-25

## 2023-09-22 RX ORDER — USTEKINUMAB 90 MG/ML
1 INJECTION INJECTION, SOLUTION SUBCUTANEOUS ONCE
Qty: 1 | Refills: 11 | Status: ACTIVE | COMMUNITY

## 2023-09-22 RX ORDER — COLESEVELAM HYDROCHLORIDE 625 MG/1
3 TABLETS WITH MEALS TABLET, FILM COATED ORAL TWICE A DAY
Qty: 180 TABLET | Refills: 6 | Status: DISCONTINUED | COMMUNITY
Start: 2022-08-01

## 2023-09-22 RX ORDER — USTEKINUMAB 90 MG/ML
1 INJECTION INJECTION, SOLUTION SUBCUTANEOUS ONCE
Qty: 1 | Refills: 11 | OUTPATIENT

## 2023-09-22 RX ORDER — SERTRALINE HYDROCHLORIDE 25 MG/1
1 TABLET TABLET, FILM COATED ORAL ONCE A DAY
COMMUNITY

## 2023-09-22 RX ORDER — PANTOPRAZOLE SODIUM 40 MG/1
1 TABLET TABLET, DELAYED RELEASE ORAL ONCE A DAY
Qty: 30 TABLET | Refills: 11 | Status: ACTIVE | COMMUNITY

## 2023-09-22 NOTE — HPI-TODAY'S VISIT:
Pt Louie is a 48-year-old female with history of ileal Crohn's disease and breast cancer (in remission) and Stelara (started 12/2020), here for evaluation of refractory dz. . Pt is referred by Dr Leon. . Pt was diagnosed with CD in 2011.  She has been on Humira until after her surgery in 9/2020.  CT imaging 7/7/2020 with ileitis--long segment wall thickneing of the distal ileum with mild upstream dilatation of the small bowel, patient did not want admission and was discharged. Again presented to ER 7/15/2020 with abdominal pain, n/v. Distal and termial ileitis again noted witj mild distention of .  Patient was asmall bowel loops proximal to the segment of inflamed distal ileum. Patient admitted and received IV steroids.  Inpatient colonoscopy performed with Dr. Arce 7/17/2020 with stricture at the ICV unable to traverse, not amenable to balloon dilation. Bx of the ICV with no significant pathologic changes. Had surgery performed in 7/2023 with VANDA with ovarian sparring (with mesh). . Previously on 11/3/2020, pt reports that she has up to 3-5 BM per day, no blood in the stool.  No abdominal pain (since her surgery).  She lost 10lbs of weight, unintentional.  No N/V. Previously on 2/14/2022, pt reports that she continues to have diarrhea, takes 1-2 imodium per day.  No abd pain, no blood in the stool. Previously on 8/1/2022, pt reports that her  had CABG surgery last weekend, doing well.  She has lost 15 lbs of weight unintentionally.  She believes she is having malabsorption, including diarrhea and sxs of low blood sugar, extreme exhaustion, and anxiety.  She is having lot of diarrhea, bile as well seen. Previously on 8/21/2023, pt reports that she had hysterectomy in July.  Since then, she has been doing poorly.  However, has been unable to eat much, lot of abdominal pain, thought it was a bowel obstruction, but CT at [facility] was neg. Saw some fluid in the pelvis and enlarged liver.  Having lot of diarrhea, yellow in color.   . Today on 9/22/2023, pt reports that since her VANDA, she has lost 30lbs (performed in 7/2023).  Had a flare after surgery.  Having lot of diarrhea, worse since VANDA.  Took Welchol, did not work.  Imodium only delays time of BM, but does not really help.  Went to ER for concerns for SBO, but CT was negative.  No longer having abd pain. . CT 9/2023 (performed after VANDA) 1. Improving multifocal pelvic fluid collections. There are areas of residual phlegmon as discussed above. No drainable fluid collection seen at this time. 2. Inflammatory changes surrounding the rectosigmoid colon appear similar in the interim and may reflect an infectious or inflammatory colitis. Inflammatory changes along the bladder wall and pelvic small bowel are likely secondary. There is no bowel obstruction or free air. 3. Hysterectomy. . 11/19/2021: Colon: - Prior evidence of resection. Findings: - The terminal ileum appeared normal. This was biopsied with a cold forceps for histology. The pathology specimen was placed into Bottle Number 1. - Localized inflammation, mild in severity and characterized by erosions, erythema and shallow ulcerations was found in the surgical anastomosis. Biopsies were taken with a cold forceps for histology. The pathology specimen was placed into Bottle Number 2. - The colon (entire examined portion) appeared normal. Biopsies were taken with a cold forceps for histology. The pathology specimen was placed into Bottle Number 3. - The entire examined colon appeared normal on direct and retroflexion views. . Pathology: mild acute ileitis at both sites; normal colon . PMH: Breast cancer, stage 1 (2015) in remission SH: No smoke; no ETOH.  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

## 2023-10-02 ENCOUNTER — WEB ENCOUNTER (OUTPATIENT)
Dept: URBAN - METROPOLITAN AREA CLINIC 96 | Facility: CLINIC | Age: 48
End: 2023-10-02

## 2023-10-02 RX ORDER — USTEKINUMAB 90 MG/ML
1 INJECTION INJECTION, SOLUTION SUBCUTANEOUS ONCE
Qty: 1 | Refills: 11
End: 2025-08-04

## 2023-11-10 ENCOUNTER — OFFICE VISIT (OUTPATIENT)
Dept: URBAN - METROPOLITAN AREA SURGERY CENTER 18 | Facility: SURGERY CENTER | Age: 48
End: 2023-11-10
Payer: COMMERCIAL

## 2023-11-10 DIAGNOSIS — K50.80 CROHN'S DISEASE OF THE SMALL BOWEL AND COLON: ICD-10-CM

## 2023-11-10 DIAGNOSIS — K63.89 OTHER SPECIFIED DISEASES OF INTESTINE: ICD-10-CM

## 2023-11-10 DIAGNOSIS — K50.80 CROHN'S DISEASE OF BOTH SMALL AND LARGE INTESTINE WITHOUT COMPLICATIONS: ICD-10-CM

## 2023-11-10 DIAGNOSIS — K63.9 MUCOSAL ABNORMALITY OF COLON: ICD-10-CM

## 2023-11-10 DIAGNOSIS — Z98.0 INTESTINAL ANASTOMOSIS PRESENT: ICD-10-CM

## 2023-11-10 DIAGNOSIS — Z98.0 INTESTINAL BYPASS AND ANASTOMOSIS STATUS: ICD-10-CM

## 2023-11-10 PROCEDURE — 45380 COLONOSCOPY AND BIOPSY: CPT | Performed by: INTERNAL MEDICINE

## 2023-11-10 PROCEDURE — G8907 PT DOC NO EVENTS ON DISCHARG: HCPCS | Performed by: INTERNAL MEDICINE

## 2023-11-10 PROCEDURE — 00811 ANES LWR INTST NDSC NOS: CPT | Performed by: NURSE ANESTHETIST, CERTIFIED REGISTERED

## 2024-01-29 ENCOUNTER — OFFICE VISIT (OUTPATIENT)
Dept: URBAN - METROPOLITAN AREA TELEHEALTH 2 | Facility: TELEHEALTH | Age: 49
End: 2024-01-29
Payer: COMMERCIAL

## 2024-01-29 ENCOUNTER — DASHBOARD ENCOUNTERS (OUTPATIENT)
Age: 49
End: 2024-01-29

## 2024-01-29 ENCOUNTER — TELEPHONE ENCOUNTER (OUTPATIENT)
Dept: URBAN - METROPOLITAN AREA CLINIC 96 | Facility: CLINIC | Age: 49
End: 2024-01-29

## 2024-01-29 DIAGNOSIS — K90.89 BILE SALT-INDUCED DIARRHEA: ICD-10-CM

## 2024-01-29 DIAGNOSIS — R10.84 GENERALIZED ABDOMINAL PAIN: ICD-10-CM

## 2024-01-29 DIAGNOSIS — R19.7 DIARRHEA: ICD-10-CM

## 2024-01-29 DIAGNOSIS — K50.80 CROHN'S COLITIS: ICD-10-CM

## 2024-01-29 PROCEDURE — 99214 OFFICE O/P EST MOD 30 MIN: CPT | Performed by: INTERNAL MEDICINE

## 2024-01-29 RX ORDER — PANTOPRAZOLE SODIUM 40 MG/1
1 TABLET TABLET, DELAYED RELEASE ORAL ONCE A DAY
Qty: 30 TABLET | Refills: 11 | Status: ACTIVE | COMMUNITY

## 2024-01-29 RX ORDER — CYANOCOBALAMIN 1000 UG/ML
1 ML INJECTION INTRAMUSCULAR; SUBCUTANEOUS
Qty: 4 | Refills: 11
End: 2025-01-24

## 2024-01-29 RX ORDER — USTEKINUMAB 90 MG/ML
1 INJECTION INJECTION, SOLUTION SUBCUTANEOUS ONCE
Qty: 1 | Refills: 11 | Status: ACTIVE | COMMUNITY
End: 2025-08-04

## 2024-01-29 RX ORDER — ONDANSETRON HYDROCHLORIDE 4 MG/1
1 TABLET TABLET, FILM COATED ORAL EVERY 8 HOURS PRN
Qty: 60 | Refills: 3 | COMMUNITY
Start: 2022-06-19

## 2024-01-29 RX ORDER — SERTRALINE HYDROCHLORIDE 25 MG/1
1 TABLET TABLET, FILM COATED ORAL ONCE A DAY
COMMUNITY

## 2024-01-29 RX ORDER — CYANOCOBALAMIN 1000 UG/ML
1 ML INJECTION INTRAMUSCULAR; SUBCUTANEOUS
Qty: 4 | Refills: 11 | OUTPATIENT

## 2024-01-29 RX ORDER — USTEKINUMAB 90 MG/ML
1 INJECTION INJECTION, SOLUTION SUBCUTANEOUS ONCE
Qty: 1 | Refills: 11 | OUTPATIENT

## 2024-01-29 RX ORDER — ERGOCALCIFEROL 1.25 MG/1
TAKE 1 CAPSULE (50,000 UNIT) BY ORAL ROUTE ONCE WEEKLY CAPSULE ORAL
Qty: 10 | Refills: 0 | COMMUNITY
Start: 2019-06-25

## 2024-01-29 RX ORDER — CYANOCOBALAMIN 1000 UG/ML
1 ML INJECTION INTRAMUSCULAR; SUBCUTANEOUS
Qty: 4 | Refills: 11 | Status: ACTIVE | COMMUNITY

## 2024-01-29 RX ORDER — LISINOPRIL 10 MG/1
TABLET ORAL
Qty: 0 | Refills: 0 | COMMUNITY
Start: 1900-01-01

## 2024-01-29 NOTE — HPI-TODAY'S VISIT:
Pt Louie is a 48-year-old female with history of ileal Crohn's disease and breast cancer (in remission) and Stelara (started 12/2020), here for evaluation of refractory dz. . Pt is referred by Dr Leon. . Pt was diagnosed with CD in 2011.  She has been on Humira until after her surgery in 9/2020.  CT imaging 7/7/2020 with ileitis--long segment wall thickneing of the distal ileum with mild upstream dilatation of the small bowel, patient did not want admission and was discharged. Again presented to ER 7/15/2020 with abdominal pain, n/v. Distal and termial ileitis again noted witj mild distention of .  Patient was asmall bowel loops proximal to the segment of inflamed distal ileum. Patient admitted and received IV steroids.  Inpatient colonoscopy performed with Dr. Arce 7/17/2020 with stricture at the ICV unable to traverse, not amenable to balloon dilation. Bx of the ICV with no significant pathologic changes. Had surgery performed in 7/2023 with VANDA with ovarian sparring (with mesh). . Previously on 11/3/2020, pt reports that she has up to 3-5 BM per day, no blood in the stool.  No abdominal pain (since her surgery).  She lost 10lbs of weight, unintentional.  No N/V. Previously on 2/14/2022, pt reports that she continues to have diarrhea, takes 1-2 imodium per day.  No abd pain, no blood in the stool. Previously on 8/1/2022, pt reports that her  had CABG surgery last weekend, doing well.  She has lost 15 lbs of weight unintentionally.  She believes she is having malabsorption, including diarrhea and sxs of low blood sugar, extreme exhaustion, and anxiety.  She is having lot of diarrhea, bile as well seen. Previously on 8/21/2023, pt reports that she had hysterectomy in July.  Since then, she has been doing poorly.  However, has been unable to eat much, lot of abdominal pain, thought it was a bowel obstruction, but CT at [facility] was neg. Saw some fluid in the pelvis and enlarged liver.  Having lot of diarrhea, yellow in color. Previously  on 9/22/2023, pt reports that since her VANDA, she has lost 30lbs (performed in 7/2023).  Had a flare after surgery.  Having lot of diarrhea, worse since VANDA.  Took Welchol, did not work.  Imodium only delays time of BM, but does not really help.  Went to ER for concerns for SBO, but CT was negative.  No longer having abd pain. . Today on 1/29/2024, pt reports seeing functional medicine; having some solid BM, some constipation; diarrhea getting better. Reduced lactose. . CT 9/2023 (performed after VANDA) 1. Improving multifocal pelvic fluid collections. There are areas of residual phlegmon as discussed above. No drainable fluid collection seen at this time. 2. Inflammatory changes surrounding the rectosigmoid colon appear similar in the interim and may reflect an infectious or inflammatory colitis. Inflammatory changes along the bladder wall and pelvic small bowel are likely secondary. There is no bowel obstruction or free air. 3. Hysterectomy. . 11/2023: Prior evidence of resection. Anastamosis was patent and normal in appearance.Findings:Patchy inflammation characterized by erosions and granularity was found in the distal ileum and in the terminal ileum.The inflammation was mild in severity. Biopsies were taken with a cold forceps for histology. The pathology specimenwas placed into Bottle Number 1. An area of mildly erythematous mucosa was found in the ascending colon. This was biopsied with a cold forceps forhistology. The pathology specimen was placed into Bottle Number 2.The descending colon appeared normal. Biopsies were taken with a cold forceps for histology. The pathologyspecimen was placed into Bottle Number 3.An area of erythematous mucosa was found in the sigmoid colon. This was biopsied with a cold forceps for histology.The pathology specimen was placed into Bottle Number 4. The pathology specimen was placed into Bottle Number 4.The rectum appeared normal.The exam was otherwise without abnormality on direct and retroflexion views.Overall, minimal active disease. . A Terminal ileum (Biopsy):Small bowel mucosa with rare lamina propria neutrophils.No histologic evidence chronic ileitis.No granulomata or dysplasia identified.B Ascending colon (Biopsy):Colonic mucosa with no significant histopathology.No histologic evidence of active, chronic or microscopic colitis.No granulomata or dysplasia identified.C Descending colon (Biopsy):Colonic mucosa with no significant histopathology.No histologic evidence of active, chronic or microscopic colitis.No granulomata or dysplasia identified.D Sigmoid colon (Biopsy):Colonic mucosa with no significant histopathology.No histologic evidence of active, chronic or microscopic colitis.No granulomata or dysplasia identified. .  11/19/2021: Colon: - Prior evidence of resection. Findings: - The terminal ileum appeared normal. This was biopsied with a cold forceps for histology. The pathology specimen was placed into Bottle Number 1. - Localized inflammation, mild in severity and characterized by erosions, erythema and shallow ulcerations was found in the surgical anastomosis. Biopsies were taken with a cold forceps for histology. The pathology specimen was placed into Bottle Number 2. - The colon (entire examined portion) appeared normal. Biopsies were taken with a cold forceps for histology. The pathology specimen was placed into Bottle Number 3. - The entire examined colon appeared normal on direct and retroflexion views. . Pathology: mild acute ileitis at both sites; normal colon . PMH: Breast cancer, stage 1 (2015) in remission SH: No smoke; no ETOH.  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

## 2024-05-13 ENCOUNTER — WEB ENCOUNTER (OUTPATIENT)
Dept: URBAN - METROPOLITAN AREA CLINIC 96 | Facility: CLINIC | Age: 49
End: 2024-05-13

## 2025-01-06 NOTE — PHYSICAL EXAM HENT:
Head, normocephalic, atraumatic, Face, Face within normal limits, Ears, External ears within normal limits, Nose/Nasopharynx, External nose  normal appearance, nares patent, no nasal discharge, Mouth and Throat, Oral cavity appearance normal, Breath odor normal, Lips, Appearance normal
60

## 2025-03-05 ENCOUNTER — TELEPHONE ENCOUNTER (OUTPATIENT)
Dept: URBAN - METROPOLITAN AREA CLINIC 96 | Facility: CLINIC | Age: 50
End: 2025-03-05

## 2025-03-05 RX ORDER — CYANOCOBALAMIN 1000 UG/ML
1 ML INJECTION INTRAMUSCULAR; SUBCUTANEOUS
Qty: 4 | Refills: 3

## 2025-04-18 ENCOUNTER — WEB ENCOUNTER (OUTPATIENT)
Dept: URBAN - METROPOLITAN AREA CLINIC 96 | Facility: CLINIC | Age: 50
End: 2025-04-18

## 2025-04-18 RX ORDER — USTEKINUMAB 90 MG/ML
1 INJECTION INJECTION, SOLUTION SUBCUTANEOUS ONCE
Qty: 1 | Refills: 11
End: 2027-02-19

## 2025-04-28 ENCOUNTER — TELEPHONE ENCOUNTER (OUTPATIENT)
Dept: URBAN - METROPOLITAN AREA CLINIC 96 | Facility: CLINIC | Age: 50
End: 2025-04-28

## 2025-05-01 ENCOUNTER — TELEPHONE ENCOUNTER (OUTPATIENT)
Dept: URBAN - METROPOLITAN AREA CLINIC 96 | Facility: CLINIC | Age: 50
End: 2025-05-01

## 2025-07-23 ENCOUNTER — TELEPHONE ENCOUNTER (OUTPATIENT)
Dept: URBAN - METROPOLITAN AREA CLINIC 96 | Facility: CLINIC | Age: 50
End: 2025-07-23

## 2025-07-23 RX ORDER — CYANOCOBALAMIN 1000 UG/ML
1 ML INJECTION INTRAMUSCULAR; SUBCUTANEOUS
Qty: 4 | Refills: 3